# Patient Record
Sex: MALE | Race: WHITE | NOT HISPANIC OR LATINO | Employment: OTHER | URBAN - METROPOLITAN AREA
[De-identification: names, ages, dates, MRNs, and addresses within clinical notes are randomized per-mention and may not be internally consistent; named-entity substitution may affect disease eponyms.]

---

## 2018-06-29 ENCOUNTER — OFFICE VISIT (OUTPATIENT)
Dept: URGENT CARE | Facility: CLINIC | Age: 38
End: 2018-06-29
Payer: MEDICAID

## 2018-06-29 VITALS
BODY MASS INDEX: 27.58 KG/M2 | OXYGEN SATURATION: 97 % | SYSTOLIC BLOOD PRESSURE: 149 MMHG | HEIGHT: 68 IN | TEMPERATURE: 98 F | WEIGHT: 182 LBS | DIASTOLIC BLOOD PRESSURE: 99 MMHG | HEART RATE: 86 BPM

## 2018-06-29 DIAGNOSIS — R73.9 HYPERGLYCEMIA: Primary | ICD-10-CM

## 2018-06-29 LAB
BILIRUB UR QL STRIP: NEGATIVE
GLUCOSE SERPL-MCNC: >700 MG/DL (ref 70–110)
GLUCOSE UR QL STRIP: POSITIVE
KETONES UR QL STRIP: POSITIVE
LEUKOCYTE ESTERASE UR QL STRIP: NEGATIVE
PH, POC UA: 6 (ref 5–8)
POC ANION GAP: 20 MMOL/L (ref 10–20)
POC BLOOD, URINE: NEGATIVE
POC BUN: 17 MMOL/L (ref 8–26)
POC CHLORIDE: 85 MMOL/L (ref 98–109)
POC CREATININE: 0.7 MG/DL (ref 0.6–1.3)
POC HEMATOCRIT: 47 %PCV (ref 42–52)
POC HEMOGLOBIN: 16 G/DL (ref 13.5–18)
POC ICA: 1.21 MMOL/L (ref 1.12–1.32)
POC NITRATES, URINE: NEGATIVE
POC POTASSIUM: 4.6 MMOL/L (ref 3.5–4.9)
POC SODIUM: 125 MMOL/L (ref 138–146)
POC TCO2: 25 MMOL/L (ref 24–29)
PROT UR QL STRIP: NEGATIVE
SP GR UR STRIP: 1.01 (ref 1–1.03)
UROBILINOGEN UR STRIP-ACNC: NORMAL (ref 0.3–2.2)

## 2018-06-29 PROCEDURE — 80047 BASIC METABLC PNL IONIZED CA: CPT | Mod: QW,S$GLB,, | Performed by: PHYSICIAN ASSISTANT

## 2018-06-29 PROCEDURE — 99214 OFFICE O/P EST MOD 30 MIN: CPT | Mod: 25,S$GLB,, | Performed by: PHYSICIAN ASSISTANT

## 2018-06-29 PROCEDURE — 81003 URINALYSIS AUTO W/O SCOPE: CPT | Mod: QW,S$GLB,, | Performed by: PHYSICIAN ASSISTANT

## 2018-06-29 RX ORDER — ZOLPIDEM TARTRATE 10 MG/1
10 TABLET ORAL NIGHTLY PRN
COMMUNITY

## 2018-06-29 NOTE — PROGRESS NOTES
"Subjective:       Patient ID: Sherlyn Collins III is a 38 y.o. male.    Vitals:  height is 5' 8" (1.727 m) and weight is 82.6 kg (182 lb). His oral temperature is 97.7 °F (36.5 °C). His blood pressure is 149/99 (abnormal) and his pulse is 86. His oxygen saturation is 97%.     Chief Complaint: Blood Sugar Problem    Pt states his blood sugar at home was 528. Mother reports pt is "off"      Other   This is a new problem. Episode onset: 1 month. The problem occurs constantly. The problem has been gradually worsening. Associated symptoms include fatigue, nausea, vomiting and weakness. Pertinent negatives include no abdominal pain, chest pain, chills, congestion, coughing, fever, headaches, rash or sore throat. He has tried nothing for the symptoms.     Review of Systems   Constitution: Positive for fatigue and weakness. Negative for chills and fever.   HENT: Negative for congestion and sore throat.    Eyes: Positive for blurred vision.   Cardiovascular: Negative for chest pain.   Respiratory: Negative for cough and shortness of breath.    Skin: Negative for rash.   Musculoskeletal: Negative for back pain and joint pain.   Gastrointestinal: Positive for nausea and vomiting. Negative for abdominal pain and diarrhea.        Body aches   Neurological: Positive for excessive daytime sleepiness, dizziness, light-headedness and loss of balance. Negative for headaches.   Psychiatric/Behavioral: The patient is not nervous/anxious.        Objective:      Physical Exam   Constitutional: He is oriented to person, place, and time. He appears well-developed and well-nourished. He is cooperative. He appears ill. No distress.   HENT:   Head: Normocephalic and atraumatic.   Right Ear: Hearing, tympanic membrane, external ear and ear canal normal.   Left Ear: Hearing, tympanic membrane, external ear and ear canal normal.   Nose: Nose normal. No mucosal edema, rhinorrhea or nasal deformity. No epistaxis. Right sinus exhibits no " maxillary sinus tenderness and no frontal sinus tenderness. Left sinus exhibits no maxillary sinus tenderness and no frontal sinus tenderness.   Mouth/Throat: Uvula is midline, oropharynx is clear and moist and mucous membranes are normal. No trismus in the jaw. Normal dentition. No uvula swelling. No posterior oropharyngeal erythema.   Eyes: Conjunctivae and lids are normal. Right eye exhibits no discharge. Left eye exhibits no discharge. No scleral icterus.   Sclera clear bilat   Neck: Trachea normal, normal range of motion, full passive range of motion without pain and phonation normal. Neck supple.   Cardiovascular: Normal rate, regular rhythm, S1 normal, S2 normal, normal heart sounds, intact distal pulses and normal pulses.    Pulmonary/Chest: Effort normal and breath sounds normal. No respiratory distress. He has no decreased breath sounds. He has no wheezes. He has no rhonchi. He has no rales.   Abdominal: Soft. Normal appearance and bowel sounds are normal. He exhibits no distension, no pulsatile midline mass and no mass. There is no tenderness.   Musculoskeletal: Normal range of motion. He exhibits no edema or deformity.   Neurological: He is alert and oriented to person, place, and time. No cranial nerve deficit. He exhibits normal muscle tone. Coordination normal.   Skin: Skin is warm, dry and intact. He is not diaphoretic. No pallor.   Psychiatric: He has a normal mood and affect. Judgment and thought content normal. His speech is delayed (minimal). He is slowed. Cognition and memory are normal.   Nursing note and vitals reviewed.      Office Visit on 06/29/2018   Component Date Value Ref Range Status    POC Sodium 06/29/2018 125* 138 - 146 MMOL/L Final    POC Potassium 06/29/2018 4.6  3.5 - 4.9 MMOL/L Final    POC Chloride 06/29/2018 85* 98 - 109 MMOL/L Final    POC BUN 06/29/2018 17  8 - 26 MMOL/L Final    POC Glucose 06/29/2018 >700  70 - 110 MG/DL Final    POC Creatinine 06/29/2018 0.7  0.6 -  1.3 mg/dL Final    POC iCA 06/29/2018 1.21  1.12 - 1.32 MMOL/L Final    POC TCO2 06/29/2018 25  24 - 29 MMOL/L Final    POC Hematocrit 06/29/2018 47  42 - 52 %PCV Final    POC Hemoglobin 06/29/2018 16.0  13.5 - 18 g/dL Final    POC Anion Gap 06/29/2018 20  10.0 - 20 MMOL/L Final    POC Blood, Urine 06/29/2018 Negative  Negative Final    POC Bilirubin, Urine 06/29/2018 Negative  Negative Final    POC Urobilinogen, Urine 06/29/2018 Normal  0.3 - 2.2 Final    POC Ketones, Urine 06/29/2018 Positive* Negative Final    50    POC Protein, Urine 06/29/2018 Negative  Negative Final    POC Nitrates, Urine 06/29/2018 Negative  Negative Final    POC Glucose, Urine 06/29/2018 Positive* Negative Final    2000    pH, UA 06/29/2018 6.0  5 - 8 Final    POC Specific Gravity, Urine 06/29/2018 1.015  1.003 - 1.029 Final    POC Leukocytes, Urine 06/29/2018 Negative  Negative Final       Assessment:       1. Hyperglycemia        Plan:       - Pt declined transport by ambulance and will go by escort (mother is present with him).    Hyperglycemia  -     POCT Chemistry Panel  -     POCT Urinalysis, Dipstick, Automated, W/O Scope  -     Refer to Emergency Dept.      Patient Instructions   - Please report directly to the emergency department for further evaluation

## 2018-06-30 PROBLEM — R74.8 ELEVATED LIVER ENZYMES: Status: ACTIVE | Noted: 2018-06-30

## 2018-06-30 PROBLEM — K21.9 GERD (GASTROESOPHAGEAL REFLUX DISEASE): Status: ACTIVE | Noted: 2018-06-30

## 2018-06-30 PROBLEM — R10.84 GENERALIZED ABDOMINAL PAIN: Status: ACTIVE | Noted: 2018-06-30

## 2018-07-02 PROBLEM — E11.9 TYPE 2 DIABETES MELLITUS WITHOUT COMPLICATION, WITHOUT LONG-TERM CURRENT USE OF INSULIN: Status: ACTIVE | Noted: 2018-07-02

## 2018-11-06 ENCOUNTER — TELEPHONE (OUTPATIENT)
Dept: ADMINISTRATIVE | Facility: HOSPITAL | Age: 38
End: 2018-11-06

## 2018-11-19 PROBLEM — L40.9 PSORIASIS: Status: ACTIVE | Noted: 2018-11-19

## 2018-11-19 PROBLEM — B18.2 CHRONIC HEPATITIS C WITHOUT HEPATIC COMA: Status: ACTIVE | Noted: 2018-11-19

## 2019-01-04 ENCOUNTER — TELEPHONE (OUTPATIENT)
Dept: PHARMACY | Facility: CLINIC | Age: 39
End: 2019-01-04

## 2019-01-08 NOTE — TELEPHONE ENCOUNTER
DOCUMENTATION ONLY:  Prior authorization for Mavyret approved from 01/07/2019 to 04/01/2019 x 12 weeks of treatment.   Case ID# [id number]    Co-pay: $3.00    Patient Assistance IS NOT required.     Forward to clinical pharmacist for consult & shipment.

## 2019-01-09 ENCOUNTER — TELEPHONE (OUTPATIENT)
Dept: PHARMACY | Facility: CLINIC | Age: 39
End: 2019-01-09

## 2019-01-09 DIAGNOSIS — B18.2 CHRONIC HEPATITIS C WITHOUT HEPATIC COMA: Primary | ICD-10-CM

## 2019-01-16 NOTE — TELEPHONE ENCOUNTER
Attempted to contact patient for initial clinical consult Carlos Enrique.  Unable to contact patient - CARLOS.

## 2019-01-30 ENCOUNTER — TELEPHONE (OUTPATIENT)
Dept: PHARMACY | Facility: CLINIC | Age: 39
End: 2019-01-30

## 2019-01-30 NOTE — TELEPHONE ENCOUNTER
Initial clinical follow-up conducted for Mavyret.  Name/ confirmed.  No missed doses; no new medications; no side effects noted.  Confirmed that patient is taking Mavyret 3 pills once daily with food.  Patient understands to report any medication changes to [facility] and provider. All questions answered and addressed to patients satisfaction.

## 2019-02-13 ENCOUNTER — TELEPHONE (OUTPATIENT)
Dept: PHARMACY | Facility: CLINIC | Age: 39
End: 2019-02-13

## 2019-02-13 NOTE — TELEPHONE ENCOUNTER
Mavyret (2 of 3)  refill confirmed. We will ship Mavyret refill on  via [employer]ex to arrive on 2/15. $3.00 copay- 004. Confirmed 2 patient identifiers - name and .     Patient has 8 doses of Mavyret remaining and takes it nightly.  Pt reports no issues or side effects.  No missed doses, no new medications, no new allergies or health conditions reported at this time. Patient also reports bowel movements have normalized and become more regular since starting Mavyret.  All questions answered and addressed to patients satisfaction. Pt verbalized understanding.

## 2019-03-12 ENCOUNTER — TELEPHONE (OUTPATIENT)
Dept: PHARMACY | Facility: CLINIC | Age: 39
End: 2019-03-12

## 2019-03-14 NOTE — TELEPHONE ENCOUNTER
Mavyret (3 of 3)  refill confirmed. We will ship Mavyret refill on 3/18 via fedex to arrive on 3/19. $3.00 copay- 004. Confirmed 2 patient identifiers - name and .     Patient has 7 doses of Mavyret remaining and takes it around 7pm daily.  Pt reports one vomiting episode, but reports source was probably from some spoiled food.  No missed doses, no new medications, no new allergies or health conditions reported at this time.  All questions answered and addressed to patients satisfaction.  Pt verbalized understanding.

## 2019-07-30 PROBLEM — K74.60 HEPATIC CIRRHOSIS DUE TO CHRONIC HEPATITIS C INFECTION: Status: ACTIVE | Noted: 2018-11-19

## 2019-07-30 PROBLEM — R93.3 ABNORMAL CT SCAN, GASTROINTESTINAL TRACT: Status: ACTIVE | Noted: 2019-07-30

## 2019-08-26 PROBLEM — G40.909 SEIZURE DISORDER: Status: ACTIVE | Noted: 2019-08-26

## 2019-09-23 PROBLEM — R93.89 ABNORMAL CT SCAN: Status: ACTIVE | Noted: 2019-09-23

## 2019-12-19 PROBLEM — K50.90 CROHN'S DISEASE: Status: ACTIVE | Noted: 2019-12-19

## 2020-03-07 ENCOUNTER — OFFICE VISIT (OUTPATIENT)
Dept: URGENT CARE | Facility: CLINIC | Age: 40
End: 2020-03-07
Payer: MEDICAID

## 2020-03-07 VITALS
BODY MASS INDEX: 30.77 KG/M2 | RESPIRATION RATE: 19 BRPM | HEIGHT: 68 IN | SYSTOLIC BLOOD PRESSURE: 136 MMHG | WEIGHT: 203 LBS | OXYGEN SATURATION: 97 % | TEMPERATURE: 98 F | HEART RATE: 86 BPM | DIASTOLIC BLOOD PRESSURE: 87 MMHG

## 2020-03-07 DIAGNOSIS — Z20.828 EXPOSURE TO THE FLU: ICD-10-CM

## 2020-03-07 DIAGNOSIS — R68.89 FLU-LIKE SYMPTOMS: Primary | ICD-10-CM

## 2020-03-07 LAB
CTP QC/QA: YES
FLUAV AG NPH QL: NEGATIVE
FLUBV AG NPH QL: NEGATIVE

## 2020-03-07 PROCEDURE — 99214 OFFICE O/P EST MOD 30 MIN: CPT | Mod: 25,S$GLB,, | Performed by: NURSE PRACTITIONER

## 2020-03-07 PROCEDURE — 87804 POCT INFLUENZA A/B: ICD-10-PCS | Mod: 59,QW,S$GLB, | Performed by: NURSE PRACTITIONER

## 2020-03-07 PROCEDURE — 87804 INFLUENZA ASSAY W/OPTIC: CPT | Mod: QW,S$GLB,, | Performed by: NURSE PRACTITIONER

## 2020-03-07 PROCEDURE — 99214 PR OFFICE/OUTPT VISIT, EST, LEVL IV, 30-39 MIN: ICD-10-PCS | Mod: 25,S$GLB,, | Performed by: NURSE PRACTITIONER

## 2020-03-07 RX ORDER — OSELTAMIVIR PHOSPHATE 75 MG/1
75 CAPSULE ORAL 2 TIMES DAILY
Qty: 10 CAPSULE | Refills: 0 | Status: SHIPPED | OUTPATIENT
Start: 2020-03-07 | End: 2020-03-12

## 2020-03-07 RX ORDER — BENZONATATE 200 MG/1
200 CAPSULE ORAL 3 TIMES DAILY PRN
Qty: 30 CAPSULE | Refills: 0 | Status: ON HOLD | OUTPATIENT
Start: 2020-03-07 | End: 2023-02-18 | Stop reason: HOSPADM

## 2020-03-07 NOTE — PROGRESS NOTES
"Subjective:       Patient ID: Sherlyn Collins III is a 39 y.o. male.    Vitals:  height is 5' 8" (1.727 m) and weight is 92.1 kg (203 lb). His temperature is 98.3 °F (36.8 °C). His blood pressure is 136/87 and his pulse is 86. His respiration is 19 and oxygen saturation is 97%.     Chief Complaint: Cough    Last checked Glucose 1 month ago    Cough   This is a new problem. The current episode started in the past 7 days. The cough is productive of purulent sputum. Associated symptoms include headaches, myalgias and a sore throat. Pertinent negatives include no chest pain, chills, ear pain, eye redness, fever, hemoptysis, rash, shortness of breath or wheezing. He has tried OTC cough suppressant for the symptoms. The treatment provided no relief. There is no history of asthma or pneumonia.       Constitution: Negative for chills, sweating, fatigue and fever.   HENT: Positive for sore throat. Negative for ear pain, congestion, sinus pain, sinus pressure and voice change.    Neck: Negative for painful lymph nodes.   Cardiovascular: Negative for chest pain.   Eyes: Negative for eye redness.   Respiratory: Positive for cough and sputum production. Negative for chest tightness, bloody sputum, COPD, shortness of breath, stridor, wheezing and asthma.    Gastrointestinal: Negative for nausea and vomiting.   Genitourinary: Negative for dysuria, frequency, urgency and urine decreased.   Musculoskeletal: Positive for muscle ache. Negative for pain.   Skin: Negative for rash.   Allergic/Immunologic: Negative for seasonal allergies and asthma.   Neurological: Positive for headaches. Negative for dizziness, light-headedness and altered mental status.   Hematologic/Lymphatic: Negative for swollen lymph nodes.   Psychiatric/Behavioral: Negative for altered mental status and confusion.       Objective:      Physical Exam   Constitutional: He is oriented to person, place, and time. He appears well-developed and well-nourished. He is " cooperative.  Non-toxic appearance. He appears ill. No distress.   HENT:   Head: Normocephalic and atraumatic.   Right Ear: Hearing, tympanic membrane, external ear and ear canal normal.   Left Ear: Hearing, tympanic membrane, external ear and ear canal normal.   Nose: Mucosal edema and rhinorrhea present. No nasal deformity. No epistaxis. Right sinus exhibits no maxillary sinus tenderness and no frontal sinus tenderness. Left sinus exhibits no maxillary sinus tenderness and no frontal sinus tenderness.   Mouth/Throat: Uvula is midline, oropharynx is clear and moist and mucous membranes are normal. No trismus in the jaw. Normal dentition. No uvula swelling. No oropharyngeal exudate, posterior oropharyngeal edema or posterior oropharyngeal erythema.   Eyes: Conjunctivae and lids are normal. No scleral icterus.   Neck: Trachea normal, full passive range of motion without pain and phonation normal. Neck supple. No neck rigidity. No edema and no erythema present.   Cardiovascular: Normal rate, regular rhythm, normal heart sounds, intact distal pulses and normal pulses.   Pulmonary/Chest: Effort normal and breath sounds normal. No respiratory distress. He has no decreased breath sounds. He has no rhonchi.   Abdominal: Normal appearance.   Musculoskeletal: Normal range of motion. He exhibits no edema or deformity.   Neurological: He is alert and oriented to person, place, and time. He exhibits normal muscle tone. Coordination normal.   Skin: Skin is warm, dry, intact, not diaphoretic and not pale.   Psychiatric: He has a normal mood and affect. His speech is normal and behavior is normal. Judgment and thought content normal. Cognition and memory are normal.   Nursing note and vitals reviewed.        Assessment:       1. Flu-like symptoms    2. Exposure to the flu        Plan:         Flu-like symptoms  -     POCT Influenza A/B  -     oseltamivir (TAMIFLU) 75 MG capsule; Take 1 capsule (75 mg total) by mouth 2 (two) times  daily. for 5 days  Dispense: 10 capsule; Refill: 0  -     benzonatate (TESSALON) 200 MG capsule; Take 1 capsule (200 mg total) by mouth 3 (three) times daily as needed.  Dispense: 30 capsule; Refill: 0    Exposure to the flu  -     POCT Influenza A/B  -     oseltamivir (TAMIFLU) 75 MG capsule; Take 1 capsule (75 mg total) by mouth 2 (two) times daily. for 5 days  Dispense: 10 capsule; Refill: 0  -     benzonatate (TESSALON) 200 MG capsule; Take 1 capsule (200 mg total) by mouth 3 (three) times daily as needed.  Dispense: 30 capsule; Refill: 0      Results for orders placed or performed in visit on 03/07/20   POCT Influenza A/B   Result Value Ref Range    Rapid Influenza A Ag Negative Negative    Rapid Influenza B Ag Negative Negative     Acceptable Yes      Lab result reviewed and discussed with patient    Patient presentation consistent with influenza. POCT influenza obtained and negative. Due to low to moderate sensitivity and high specificity, negative rapid diagnostic tests do not exclude influenza and therefore will initiate empirical therapy with antiviral as suggested by CDC (https://www.cdc.gov). Patient does not present with any concrete signs/symptoms of pneumonia, systemic bacterial infection, or other complications, deferred CXR or further labwork at this time.      Differential diagnosis considered but not limited to strep throat, otitis media, sepsis, bronchiolitis, pneumonia.  These were thought to be less likely given the history and physical exam. Discussed influenza complications and instructed patient to seek medical attention immediately with new or worsening symptoms.      Patient Instructions     Influenza (Adult)    Influenza is also called the flu. It is a viral illness that affects the air passages of your lungs. It is different from the common cold. The flu can easily be passed from one to person to another. It may be spread through the air by coughing and sneezing. Or it can  be spread by touching the sick person and then touching your own eyes, nose, or mouth.  The flu starts 1 to 3 days after you are exposed to the flu virus. It may last for 1 to 2 weeks but many people feel tired or fatigued for many weeks afterward. You usually dont need to take antibiotics unless you have a complication. This might be an ear or sinus infection or pneumonia.  Symptoms of the flu may be mild or severe. They can include extreme tiredness (wanting to stay in bed all day), chills, fevers, muscle aches, soreness with eye movement, headache, and a dry, hacking cough.  Home care  Follow these guidelines when caring for yourself at home:  · Avoid being around cigarette smoke, whether yours or other peoples.  · Acetaminophen or ibuprofen will help ease your fever, muscle aches, and headache. Dont give aspirin to anyone younger than 18 who has the flu. Aspirin can harm the liver.  · Nausea and loss of appetite are common with the flu. Eat light meals. Drink 6 to 8 glasses of liquids every day. Good choices are water, sport drinks, soft drinks without caffeine, juices, tea, and soup. Extra fluids will also help loosen secretions in your nose and lungs.  · Over-the-counter cold medicines will not make the flu go away faster. But the medicines may help with coughing, sore throat, and congestion in your nose and sinuses. Dont use a decongestant if you have high blood pressure.  · Stay home until your fever has been gone for at least 24 hours without using medicine to reduce fever.  Follow-up care  Follow up with your healthcare provider, or as advised, if you are not getting better over the next week.  If you are age 65 or older, talk with your provider about getting a pneumococcal vaccine every 5 years. You should also get this vaccine if you have chronic asthma or COPD. All adults should get a flu vaccine every fall. Ask your provider about this.  When to seek medical advice  Call your healthcare provider  right away if any of these occur:  · Cough with lots of colored mucus (sputum) or blood in your mucus  · Chest pain, shortness of breath, wheezing, or trouble breathing  · Severe headache, or face, neck, or ear pain  · New rash with fever  · Fever of 100.4°F (38°C) or higher, or as directed by your healthcare provider  · Confusion, behavior change, or seizure  · Severe weakness or dizziness  · You get a new fever or cough after getting better for a few days  Date Last Reviewed: 1/1/2017 © 2000-2017 [employer]. [address] [place] [address]. All rights reserved. This information is not intended as a substitute for professional medical care. Always follow your healthcare professional's instructions.             The following information is provided to help you in treating upper respiratory symptoms.    Decongestant Nasal Sprays  Over-the-counter decongestant nasal spray such as Afrin, may be helpful as an initial step in treating upper respiratory infections. This spray can be used for up to approximately 3 days and is used no more than twice per day. Topical nasal decongestant spray for longer than 5 days will result in a physical addiction, in which the nasal lining will become significantly swollen and irritated until the spray is used again.     Nasal Saline  Nasal saline is available over the counter. There are several different commercial preparations such as Ocean spray and Ayr spray. There is no limit on the use of Nasal saline. Saline is used by snorting the mist up into the nose then later gently blowing the nose to get rid of any secretions that it has loosened.    Nasal Steroids  Nasal steroid medications such as Flonase are useful for upper respiratory infections, allergies, and sensitivities to airborne irritants. Unfortunately, they do not begin to work for 1-2 days, and they do not reach their maximum benefit for approximately 2-3 weeks. Initial therapy is typically 2  puffs per nostril twice per day. This should be used for only a few days, then the maintenance dosage is one or two puffs per nostril once per day. This can be done at any time of the day. The most effective way to use any nasal medication is to look down at your toes when spraying it in. Aim slightly away from the septum (dividing plate between the nostrils), and gently inhale. This ensures that the spray will go into the sinus cavities and not straight up into the nose. A good way to avoid spraying onto the septum is to use the right hand to spray into the left nostril, and vice versa for the right nostril. Occasionally, nasal steroids can increase the risk of nosebleeds, but in general they are very well tolerated and effective medications.    Antihistamines  Antihistamines are available both over-the-counter and as a prescription. There are also various decongestant and antihistamine combinations available such as Claritin, Allegra, and Zyrtec. It is best to take any antihistamine-decongestant combination in the morning to avoid insomnia. Zyrtec should probably be taken at night, in order to reduce the chance of sleepiness during the daytime. If there is a significant infection present and secretions are already thickened, it is recommended to discontinue antihistamines and use a mucous thinner/decongestant combination.    Mucous Thinners and Decongestants  Mucous thinners and decongestants are used to shrink down the tissues and promote sinus drainage. There are multiple prescription and over-the-counter varieties available. A mucous thinner will tend to be drying unless you are also drinking plenty of water when taking these. If you have high blood pressure, it is very important to monitor your pressure while on decongestants. The mucous thinner/decongestant combinations are typically given twice per day. However, some people will be unable to tolerate these at night and should only take them once per  day.    Oral Steroids  Oral steroids can be used with more sever infections. Often, they are the only medications that will reduce the symptoms of pressure and allow the nasal sinuses to drain. These are best taken on a full stomach and earlier in the day is better. They may give you some irritability, stomach upset, or hyperactivity. This can also interfere with sleep. A person who has high blood pressure or diabetes needs to be very careful to monitor their pressure or blood glucose while taking steroids. Steroids can have multiple side effects when taken long-term, but short-term doses are very well tolerated and extremely effective in controlling the symptoms associated with acute and chronic sinus infections, severe allergies, or nasal polyps. The only significant side effect of note with oral steroids is the extraordinarily uncommon occurrence of damage to the hip cartilage, which is very rare and is usually associated with long-term usage of steroids. The use of steroids for greater than approximately seven days requires a tapering down in order to discontinue them. You should not abruptly stop your steroid if you have been taking the same dose for greater than one week.    Antibiotic Treatment  Finally, when all of these other measures have failed, and a bacterial infection is present, an antibiotic will be prescribed. The most common symptoms of acute sinusitis of a bacterial nature are pain, pressure, and thick and colored nasal drainage. However, not all colored drainage means that there is a bacterial infection present. According to the Center for Disease Control, only 2% of colds will progress to result in bacterial sinusitis. Most upper respiratory infections should NOT be treated with antibiotics. Antibiotics should be reserved for upper respiratory infections which last longer than 10 days, or which worsen after 4 or 5 days of treatment. The use of antibiotics for nonbacterial upper respiratory  infections has resulted in a severe problem with the emergence of bacteria which are resistant to multiple forms of antibiotics, and some bacteria are currently only treatable with intravenous antibiotics.    Body Aches/Pains/Fever  For patients who are not allergic to and are not on anticoagulants, you can alternate Tylenol and Motrin every 4-6 hours for fever above 100.4F and/or pain.  For patients who are allergic or intolerant to NSAIDS, have gastritis, gastric ulcers, or history of GI bleeds, are pregnant, or are on anticoagulant therapy, you can take Tylenol every 4 hours as needed for fever above 100.4F and/or pain.     Maintain adequate hydration -  Rest and keep yourself/patient well hydrated. For adults, it is recommended to drink at least 8-10 glasses of water daily.  This may help thin secretions and soothe the respiratory mucosa.     Sore Throat  Perform warm, salt water gargles to help reduce inflammation and throat discomfort. Chloraseptic sprays and throat lozenges will also help with your throat pain.    COUGH  A viral cough may linger for 3 to 4 weeks but should steadily improve over time. Coughing is the body's natural way to clear mucus and help get rid of bacteria and viruses. Therefore, cough suppressants are usually not recommended.  Common cough suppressants include syrups with the ingredient dextromethorphan or DM, available over-the-counter, or prescription syrups containing codeine or hydrocone.  There is no proven benefit and have potential harms.    Dextromethorphan should be avoided in patients taking a monoamine oxidase inhibitor (MAOI).    Dextromethorphan should be used with caution in patients taking serotonergic drugs (e.g., tramadol, SSRIs).     ? Honey may be beneficial, especially on nocturnal cough.  1 to 2 teaspoons can be taken straight or diluted in tea, juice or other liquid.    The antioxidants in honey are an important contributor to its decongestant properties.  Generally speaking, darker honey contains more antioxidants. Buckwheat and avocado honey are particularly good choices. If these honeys are not available in your area, choose the darkest honey you can find.     Avoid Rhododendron honey products. These can contain grayanotoxin and can lead to cardiovascular side effects (e.g., hypotension, arrhythmias, chest pain).     1.  Take all medications as directed. If you have been prescribed antibiotics, make sure to complete them.   2.  Rest and keep yourself/patient well hydrated. For adults, it is recommended to drink at least 8-10 glasses of water daily.   3.  For patients above 6 months of age who are not allergic to and are not on anticoagulants, you can alternate Tylenol and Motrin every 4-6 hours for fever above 100.4F and/or pain.  For patients less than 6 months of age, allergic to or intolerant to NSAIDS, have gastritis, gastric ulcers, or history of GI bleeds, are pregnant, or are on anticoagulant therapy, you can take Tylenol every 4 hours as needed for fever above 100.4F and/or pain.   4. You should schedule a follow-up appointment with your Primary Care Provider for recheck in 2-3 days or as directed at this visit.   5.  If your condition fails to improve in a timely manner, you should receive another evaluation by your Primary Care Provider to discuss your concerns or return to urgent care for a recheck.      **You must understand that you have received Urgent Care treatment only and that you may be released before all of your medical problems are known or treated. You, the patient, are responsible to arrange for follow-up care as instructed. If your condition worsens at any time, you should report immediately to your nearest Emergency Department for further evaluation.

## 2020-03-07 NOTE — PATIENT INSTRUCTIONS
Influenza (Adult)    Influenza is also called the flu. It is a viral illness that affects the air passages of your lungs. It is different from the common cold. The flu can easily be passed from one to person to another. It may be spread through the air by coughing and sneezing. Or it can be spread by touching the sick person and then touching your own eyes, nose, or mouth.  The flu starts 1 to 3 days after you are exposed to the flu virus. It may last for 1 to 2 weeks but many people feel tired or fatigued for many weeks afterward. You usually dont need to take antibiotics unless you have a complication. This might be an ear or sinus infection or pneumonia.  Symptoms of the flu may be mild or severe. They can include extreme tiredness (wanting to stay in bed all day), chills, fevers, muscle aches, soreness with eye movement, headache, and a dry, hacking cough.  Home care  Follow these guidelines when caring for yourself at home:  · Avoid being around cigarette smoke, whether yours or other peoples.  · Acetaminophen or ibuprofen will help ease your fever, muscle aches, and headache. Dont give aspirin to anyone younger than 18 who has the flu. Aspirin can harm the liver.  · Nausea and loss of appetite are common with the flu. Eat light meals. Drink 6 to 8 glasses of liquids every day. Good choices are water, sport drinks, soft drinks without caffeine, juices, tea, and soup. Extra fluids will also help loosen secretions in your nose and lungs.  · Over-the-counter cold medicines will not make the flu go away faster. But the medicines may help with coughing, sore throat, and congestion in your nose and sinuses. Dont use a decongestant if you have high blood pressure.  · Stay home until your fever has been gone for at least 24 hours without using medicine to reduce fever.  Follow-up care  Follow up with your healthcare provider, or as advised, if you are not getting better over the next week.  If you are age 65 or  older, talk with your provider about getting a pneumococcal vaccine every 5 years. You should also get this vaccine if you have chronic asthma or COPD. All adults should get a flu vaccine every fall. Ask your provider about this.  When to seek medical advice  Call your healthcare provider right away if any of these occur:  · Cough with lots of colored mucus (sputum) or blood in your mucus  · Chest pain, shortness of breath, wheezing, or trouble breathing  · Severe headache, or face, neck, or ear pain  · New rash with fever  · Fever of 100.4°F (38°C) or higher, or as directed by your healthcare provider  · Confusion, behavior change, or seizure  · Severe weakness or dizziness  · You get a new fever or cough after getting better for a few days  Date Last Reviewed: 1/1/2017  © 7794-9003 [employer]. [address] [place] [address]. All rights reserved. This information is not intended as a substitute for professional medical care. Always follow your healthcare professional's instructions.             The following information is provided to help you in treating upper respiratory symptoms.    Decongestant Nasal Sprays  Over-the-counter decongestant nasal spray such as Afrin, may be helpful as an initial step in treating upper respiratory infections. This spray can be used for up to approximately 3 days and is used no more than twice per day. Topical nasal decongestant spray for longer than 5 days will result in a physical addiction, in which the nasal lining will become significantly swollen and irritated until the spray is used again.     Nasal Saline  Nasal saline is available over the counter. There are several different commercial preparations such as Ocean spray and Ayr spray. There is no limit on the use of Nasal saline. Saline is used by snorting the mist up into the nose then later gently blowing the nose to get rid of any secretions that it has loosened.    Nasal Steroids  Nasal  steroid medications such as Flonase are useful for upper respiratory infections, allergies, and sensitivities to airborne irritants. Unfortunately, they do not begin to work for 1-2 days, and they do not reach their maximum benefit for approximately 2-3 weeks. Initial therapy is typically 2 puffs per nostril twice per day. This should be used for only a few days, then the maintenance dosage is one or two puffs per nostril once per day. This can be done at any time of the day. The most effective way to use any nasal medication is to look down at your toes when spraying it in. Aim slightly away from the septum (dividing plate between the nostrils), and gently inhale. This ensures that the spray will go into the sinus cavities and not straight up into the nose. A good way to avoid spraying onto the septum is to use the right hand to spray into the left nostril, and vice versa for the right nostril. Occasionally, nasal steroids can increase the risk of nosebleeds, but in general they are very well tolerated and effective medications.    Antihistamines  Antihistamines are available both over-the-counter and as a prescription. There are also various decongestant and antihistamine combinations available such as Claritin, Allegra, and Zyrtec. It is best to take any antihistamine-decongestant combination in the morning to avoid insomnia. Zyrtec should probably be taken at night, in order to reduce the chance of sleepiness during the daytime. If there is a significant infection present and secretions are already thickened, it is recommended to discontinue antihistamines and use a mucous thinner/decongestant combination.    Mucous Thinners and Decongestants  Mucous thinners and decongestants are used to shrink down the tissues and promote sinus drainage. There are multiple prescription and over-the-counter varieties available. A mucous thinner will tend to be drying unless you are also drinking plenty of water when taking  these. If you have high blood pressure, it is very important to monitor your pressure while on decongestants. The mucous thinner/decongestant combinations are typically given twice per day. However, some people will be unable to tolerate these at night and should only take them once per day.    Oral Steroids  Oral steroids can be used with more sever infections. Often, they are the only medications that will reduce the symptoms of pressure and allow the nasal sinuses to drain. These are best taken on a full stomach and earlier in the day is better. They may give you some irritability, stomach upset, or hyperactivity. This can also interfere with sleep. A person who has high blood pressure or diabetes needs to be very careful to monitor their pressure or blood glucose while taking steroids. Steroids can have multiple side effects when taken long-term, but short-term doses are very well tolerated and extremely effective in controlling the symptoms associated with acute and chronic sinus infections, severe allergies, or nasal polyps. The only significant side effect of note with oral steroids is the extraordinarily uncommon occurrence of damage to the hip cartilage, which is very rare and is usually associated with long-term usage of steroids. The use of steroids for greater than approximately seven days requires a tapering down in order to discontinue them. You should not abruptly stop your steroid if you have been taking the same dose for greater than one week.    Antibiotic Treatment  Finally, when all of these other measures have failed, and a bacterial infection is present, an antibiotic will be prescribed. The most common symptoms of acute sinusitis of a bacterial nature are pain, pressure, and thick and colored nasal drainage. However, not all colored drainage means that there is a bacterial infection present. According to the Center for Disease Control, only 2% of colds will progress to result in bacterial  sinusitis. Most upper respiratory infections should NOT be treated with antibiotics. Antibiotics should be reserved for upper respiratory infections which last longer than 10 days, or which worsen after 4 or 5 days of treatment. The use of antibiotics for nonbacterial upper respiratory infections has resulted in a severe problem with the emergence of bacteria which are resistant to multiple forms of antibiotics, and some bacteria are currently only treatable with intravenous antibiotics.    Body Aches/Pains/Fever  For patients who are not allergic to and are not on anticoagulants, you can alternate Tylenol and Motrin every 4-6 hours for fever above 100.4F and/or pain.  For patients who are allergic or intolerant to NSAIDS, have gastritis, gastric ulcers, or history of GI bleeds, are pregnant, or are on anticoagulant therapy, you can take Tylenol every 4 hours as needed for fever above 100.4F and/or pain.     Maintain adequate hydration -  Rest and keep yourself/patient well hydrated. For adults, it is recommended to drink at least 8-10 glasses of water daily.  This may help thin secretions and soothe the respiratory mucosa.     Sore Throat  Perform warm, salt water gargles to help reduce inflammation and throat discomfort. Chloraseptic sprays and throat lozenges will also help with your throat pain.    COUGH  A viral cough may linger for 3 to 4 weeks but should steadily improve over time. Coughing is the body's natural way to clear mucus and help get rid of bacteria and viruses. Therefore, cough suppressants are usually not recommended.  Common cough suppressants include syrups with the ingredient dextromethorphan or DM, available over-the-counter, or prescription syrups containing codeine or hydrocone.  There is no proven benefit and have potential harms.    Dextromethorphan should be avoided in patients taking a monoamine oxidase inhibitor (MAOI).    Dextromethorphan should be used with caution in patients  taking serotonergic drugs (e.g., tramadol, SSRIs).     ? Honey may be beneficial, especially on nocturnal cough.  1 to 2 teaspoons can be taken straight or diluted in tea, juice or other liquid.    The antioxidants in honey are an important contributor to its decongestant properties. Generally speaking, darker honey contains more antioxidants. Buckwheat and avocado honey are particularly good choices. If these honeys are not available in your area, choose the darkest honey you can find.     Avoid Rhododendron honey products. These can contain grayanotoxin and can lead to cardiovascular side effects (e.g., hypotension, arrhythmias, chest pain).     1.  Take all medications as directed. If you have been prescribed antibiotics, make sure to complete them.   2.  Rest and keep yourself/patient well hydrated. For adults, it is recommended to drink at least 8-10 glasses of water daily.   3.  For patients above 6 months of age who are not allergic to and are not on anticoagulants, you can alternate Tylenol and Motrin every 4-6 hours for fever above 100.4F and/or pain.  For patients less than 6 months of age, allergic to or intolerant to NSAIDS, have gastritis, gastric ulcers, or history of GI bleeds, are pregnant, or are on anticoagulant therapy, you can take Tylenol every 4 hours as needed for fever above 100.4F and/or pain.   4. You should schedule a follow-up appointment with your Primary Care Provider for recheck in 2-3 days or as directed at this visit.   5.  If your condition fails to improve in a timely manner, you should receive another evaluation by your Primary Care Provider to discuss your concerns or return to urgent care for a recheck.      **You must understand that you have received Urgent Care treatment only and that you may be released before all of your medical problems are known or treated. You, the patient, are responsible to arrange for follow-up care as instructed. If your condition worsens at any  time, you should report immediately to your nearest Emergency Department for further evaluation.

## 2020-09-01 PROBLEM — K50.90 CROHN DISEASE: Status: ACTIVE | Noted: 2020-09-01

## 2021-02-08 ENCOUNTER — CLINICAL SUPPORT (OUTPATIENT)
Dept: URGENT CARE | Facility: CLINIC | Age: 41
End: 2021-02-08
Payer: MEDICARE

## 2021-02-08 DIAGNOSIS — Z01.812 ENCOUNTER FOR SCREENING LABORATORY TESTING FOR COVID-19 VIRUS IN ASYMPTOMATIC PATIENT: Primary | ICD-10-CM

## 2021-02-08 DIAGNOSIS — Z11.52 ENCOUNTER FOR SCREENING LABORATORY TESTING FOR COVID-19 VIRUS IN ASYMPTOMATIC PATIENT: Primary | ICD-10-CM

## 2021-02-08 LAB
CTP QC/QA: YES
SARS-COV-2 RDRP RESP QL NAA+PROBE: NEGATIVE

## 2021-02-08 PROCEDURE — U0002 COVID-19 LAB TEST NON-CDC: HCPCS | Mod: QW,S$GLB,, | Performed by: FAMILY MEDICINE

## 2021-02-08 PROCEDURE — U0002: ICD-10-PCS | Mod: QW,S$GLB,, | Performed by: FAMILY MEDICINE

## 2021-02-11 ENCOUNTER — OFFICE VISIT (OUTPATIENT)
Dept: URGENT CARE | Facility: CLINIC | Age: 41
End: 2021-02-11
Payer: MEDICARE

## 2021-02-11 VITALS
BODY MASS INDEX: 30.41 KG/M2 | OXYGEN SATURATION: 98 % | WEIGHT: 200 LBS | DIASTOLIC BLOOD PRESSURE: 77 MMHG | HEART RATE: 78 BPM | SYSTOLIC BLOOD PRESSURE: 130 MMHG | TEMPERATURE: 98 F

## 2021-02-11 DIAGNOSIS — E11.9 INSULIN DEPENDENT TYPE 2 DIABETES MELLITUS: ICD-10-CM

## 2021-02-11 DIAGNOSIS — Z72.0 TOBACCO ABUSE: ICD-10-CM

## 2021-02-11 DIAGNOSIS — Z11.59 SCREENING FOR VIRAL DISEASE: ICD-10-CM

## 2021-02-11 DIAGNOSIS — Z20.822 CLOSE EXPOSURE TO COVID-19 VIRUS: ICD-10-CM

## 2021-02-11 DIAGNOSIS — Z79.4 INSULIN DEPENDENT TYPE 2 DIABETES MELLITUS: ICD-10-CM

## 2021-02-11 DIAGNOSIS — Z20.822 SUSPECTED COVID-19 VIRUS INFECTION: Primary | ICD-10-CM

## 2021-02-11 DIAGNOSIS — G40.909 SEIZURE DISORDER: ICD-10-CM

## 2021-02-11 LAB
CTP QC/QA: YES
GLUCOSE SERPL-MCNC: 298 MG/DL (ref 70–110)
SARS-COV-2 RDRP RESP QL NAA+PROBE: NEGATIVE

## 2021-02-11 PROCEDURE — 82962 GLUCOSE BLOOD TEST: CPT | Mod: S$GLB,,, | Performed by: NURSE PRACTITIONER

## 2021-02-11 PROCEDURE — 99214 OFFICE O/P EST MOD 30 MIN: CPT | Mod: S$GLB,,, | Performed by: NURSE PRACTITIONER

## 2021-02-11 PROCEDURE — U0002: ICD-10-PCS | Mod: QW,CR,S$GLB, | Performed by: NURSE PRACTITIONER

## 2021-02-11 PROCEDURE — 82962 POCT GLUCOSE, HAND-HELD DEVICE: ICD-10-PCS | Mod: S$GLB,,, | Performed by: NURSE PRACTITIONER

## 2021-02-11 PROCEDURE — 99214 PR OFFICE/OUTPT VISIT, EST, LEVL IV, 30-39 MIN: ICD-10-PCS | Mod: S$GLB,,, | Performed by: NURSE PRACTITIONER

## 2021-02-11 PROCEDURE — U0002 COVID-19 LAB TEST NON-CDC: HCPCS | Mod: QW,CR,S$GLB, | Performed by: NURSE PRACTITIONER

## 2021-02-11 RX ORDER — ATORVASTATIN CALCIUM 10 MG/1
TABLET, FILM COATED ORAL
Status: ON HOLD | COMMUNITY
Start: 2021-01-11 | End: 2023-02-18 | Stop reason: HOSPADM

## 2023-02-18 PROBLEM — L02.512 ABSCESS OF LEFT HAND: Status: ACTIVE | Noted: 2023-02-18

## 2023-09-28 ENCOUNTER — HOSPITAL ENCOUNTER (EMERGENCY)
Facility: HOSPITAL | Age: 43
Discharge: HOME | End: 2023-09-28
Attending: EMERGENCY MEDICINE
Payer: MEDICARE

## 2023-09-28 VITALS
BODY MASS INDEX: 30.62 KG/M2 | HEIGHT: 68 IN | RESPIRATION RATE: 18 BRPM | WEIGHT: 202 LBS | DIASTOLIC BLOOD PRESSURE: 89 MMHG | SYSTOLIC BLOOD PRESSURE: 133 MMHG | OXYGEN SATURATION: 98 % | TEMPERATURE: 98 F | HEART RATE: 80 BPM

## 2023-09-28 DIAGNOSIS — R73.9 HYPERGLYCEMIA: Primary | ICD-10-CM

## 2023-09-28 LAB
ANION GAP SERPL CALC-SCNC: 5 MMOL/L (ref 3–11)
BUN SERPL-MCNC: 10 MG/DL (ref 6–20)
CALCIUM SERPL-MCNC: 9 MG/DL (ref 8.7–10.5)
CHLORIDE SERPL-SCNC: 105 MMOL/L (ref 95–110)
CO2 SERPL-SCNC: 28 MMOL/L (ref 23–29)
CREAT SERPL-MCNC: 1 MG/DL (ref 0.5–1.4)
EST. GFR  (NO RACE VARIABLE): >60 ML/MIN/1.73 M^2
GLUCOSE SERPL-MCNC: 190 MG/DL (ref 70–110)
POCT GLUCOSE: 177 MG/DL (ref 70–110)
POTASSIUM SERPL-SCNC: 4 MMOL/L (ref 3.5–5.1)
SODIUM SERPL-SCNC: 138 MMOL/L (ref 136–145)

## 2023-09-28 PROCEDURE — 80048 BASIC METABOLIC PNL TOTAL CA: CPT | Performed by: EMERGENCY MEDICINE

## 2023-09-28 PROCEDURE — 36415 COLL VENOUS BLD VENIPUNCTURE: CPT | Performed by: EMERGENCY MEDICINE

## 2023-09-28 PROCEDURE — 82962 GLUCOSE BLOOD TEST: CPT

## 2023-09-28 PROCEDURE — 99283 EMERGENCY DEPT VISIT LOW MDM: CPT

## 2023-09-28 PROCEDURE — 25000003 PHARM REV CODE 250: Performed by: EMERGENCY MEDICINE

## 2023-09-28 RX ORDER — METFORMIN HYDROCHLORIDE 1000 MG/1
1000 TABLET ORAL 2 TIMES DAILY
Qty: 10 TABLET | Refills: 0 | Status: SHIPPED | OUTPATIENT
Start: 2023-09-28 | End: 2024-09-27

## 2023-09-28 RX ORDER — METFORMIN HYDROCHLORIDE 1000 MG/1
1000 TABLET ORAL 2 TIMES DAILY WITH MEALS
Status: DISCONTINUED | OUTPATIENT
Start: 2023-09-28 | End: 2023-09-28

## 2023-09-28 RX ORDER — METFORMIN HYDROCHLORIDE 1000 MG/1
1000 TABLET ORAL
Status: COMPLETED | OUTPATIENT
Start: 2023-09-28 | End: 2023-09-28

## 2023-09-28 RX ADMIN — METFORMIN HYDROCHLORIDE 1000 MG: 1000 TABLET, FILM COATED ORAL at 04:09
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
